# Patient Record
Sex: FEMALE | URBAN - METROPOLITAN AREA
[De-identification: names, ages, dates, MRNs, and addresses within clinical notes are randomized per-mention and may not be internally consistent; named-entity substitution may affect disease eponyms.]

---

## 2023-09-27 ENCOUNTER — TELEPHONE (OUTPATIENT)
Dept: BREAST CENTER | Age: 53
End: 2023-09-27

## 2023-09-27 NOTE — TELEPHONE ENCOUNTER
RN made first attempt to schedule patient for consult with [facility] breast clinic. Pt was referred to office for nipple discharge and breast pain by [facility]. RN left voicemail with office contact information for patient to call back and schedule referral appt.     Verify if B/L D/C and pain or unilateral    Patient imaging from [facility] already in PACS      Patient needs scheduled for NEW PATIENT- nipple discharge, left on/off x 2years white to tan in color, somtimes happens with compression, breast pain-imaging [place](in PACS) recommended breast MRI-ref by [facility]      Electronically signed by Alaina Peraza RN on 9/27/23 at 1:31 PM EDT

## 2023-10-26 ENCOUNTER — TELEPHONE (OUTPATIENT)
Dept: BREAST CENTER | Age: 53
End: 2023-10-26

## 2023-10-26 NOTE — TELEPHONE ENCOUNTER
Second message left and attempt to schedule patient a consultation in the breast clinic. Voicemail left with callback number. Patient can be scheduled for a clinical follow-up with one of our providers, then determine if breast MRI is needed.

## 2024-01-08 ENCOUNTER — OFFICE VISIT (OUTPATIENT)
Dept: BREAST CENTER | Age: 54
End: 2024-01-08
Payer: COMMERCIAL

## 2024-01-08 VITALS
BODY MASS INDEX: 34.82 KG/M2 | TEMPERATURE: 98.2 F | RESPIRATION RATE: 16 BRPM | HEIGHT: 65 IN | SYSTOLIC BLOOD PRESSURE: 134 MMHG | WEIGHT: 209 LBS | OXYGEN SATURATION: 93 % | DIASTOLIC BLOOD PRESSURE: 86 MMHG | HEART RATE: 69 BPM

## 2024-01-08 DIAGNOSIS — N64.52 NIPPLE DISCHARGE: Primary | ICD-10-CM

## 2024-01-08 PROCEDURE — 99203 OFFICE O/P NEW LOW 30 MIN: CPT | Performed by: SURGERY

## 2024-01-08 RX ORDER — LORATADINE, PSEUDOEPHEDRINE SULFATE 5; 120 MG/1; MG/1
1 TABLET, FILM COATED, EXTENDED RELEASE ORAL EVERY 12 HOURS
COMMUNITY
Start: 2023-12-13

## 2024-01-08 RX ORDER — CITALOPRAM 40 MG/1
40 TABLET ORAL DAILY
COMMUNITY
Start: 2023-12-12

## 2024-01-08 RX ORDER — VITAMIN E 268 MG
400 CAPSULE ORAL DAILY
COMMUNITY
Start: 2023-11-22

## 2024-01-08 RX ORDER — GABAPENTIN 400 MG/1
CAPSULE ORAL
COMMUNITY
Start: 2024-01-07

## 2024-01-08 RX ORDER — MONTELUKAST SODIUM 10 MG/1
TABLET ORAL
COMMUNITY
Start: 2023-12-26

## 2024-01-08 NOTE — PROGRESS NOTES
Date of Visit: 2024  New Patient    25      DIAGNOSIS:  1. (24) BILATERAL nipple discharge  * at least one episode of blood    IMAGING/PROCEDURES:  1. (23) BILATERAL d-mammogram and US: BIRADS-0  * Breast tissue fatty replaced  * No findings  * Recommend MRI  2. (23) Film consult  * Agreement      HISTORY OF PRESENT ILLNESS  Katrin Crowell was in the office today for her consultation regarding a history of nipple discharge with episodes of bloody discharge.    Katrin informs me that she developed spontaneous bilateral discharge approximately 2 years ago.  She notes that initially it was very light in color and fairly viscous.  Over time the discharge has become less viscous and at times watery.  She has noted an episode of bloody discharge at least once from each nipple.    The patient underwent diagnostic imaging studies.  There were no reported findings however the study was given a BI-RADS 0 and a breast MRI was recommended.    The patient is in the office now to discuss the above and receive any additional recommendations.    BREAST SYMPTOMS  Katrin again notes ongoing discharge from both nipples.  She again has seen at least 1 episode of bloody discharge from either side.  The patient also reports what she describes as \"firmness\" of the nipples.  She also describes episodes of intermittent nipple retraction.  The patient reports no palpable masses.  She notes no skin retraction or discoloration.    PAST BREAST HISTORY  Katrin has had no prior breast biopsies and has had no breast surgeries.    PAST MEDICAL/SURGICAL HISTORY  No past medical history on file.  Past Surgical History:   Procedure Laterality Date     SECTION      CHOLECYSTECTOMY      TUBAL LIGATION         MEDICATIONS    Current Outpatient Medications:     vitamin D3 (CHOLECALCIFEROL) 125 MCG (5000 UT) TABS tablet, Take 1 tablet by mouth daily, Disp: , Rfl:     citalopram (CELEXA) 40 MG tablet, Take 1 tablet by mouth daily,

## 2024-01-10 ENCOUNTER — TELEPHONE (OUTPATIENT)
Dept: BREAST CENTER | Age: 54
End: 2024-01-10

## 2024-01-10 NOTE — TELEPHONE ENCOUNTER
No prior authorization required for breast MRI 17132 - per [facility] -- Reference # [id number] spoke with Esther GARCIA

## 2024-01-23 ENCOUNTER — HOSPITAL ENCOUNTER (OUTPATIENT)
Dept: MRI IMAGING | Age: 54
Discharge: HOME | End: 2024-01-25
Attending: SURGERY
Payer: COMMERCIAL

## 2024-01-23 DIAGNOSIS — N64.52 NIPPLE DISCHARGE: ICD-10-CM

## 2024-01-23 PROCEDURE — A9585 GADOBUTROL INJECTION: HCPCS | Performed by: RADIOLOGY

## 2024-01-23 PROCEDURE — 6360000004 HC RX CONTRAST MEDICATION: Performed by: RADIOLOGY

## 2024-01-23 PROCEDURE — C8908 MRI W/O FOL W/CONT, BREAST,: HCPCS

## 2024-01-23 RX ORDER — GADOBUTROL 604.72 MG/ML
9 INJECTION INTRAVENOUS
Status: COMPLETED | OUTPATIENT
Start: 2024-01-23 | End: 2024-01-23

## 2024-01-23 RX ADMIN — GADOBUTROL 9 ML: 604.72 INJECTION INTRAVENOUS at 12:56

## 2024-07-22 ENCOUNTER — TELEPHONE (OUTPATIENT)
Dept: BREAST CENTER | Age: 54
End: 2024-07-22

## 2024-07-22 NOTE — TELEPHONE ENCOUNTER
Called and left detailed message with call back number to reschedule her missed appointment today.    Electronically signed by Jaylene Campbell RN on 7/22/24 at 3:33 PM EDT

## 2024-07-22 NOTE — TELEPHONE ENCOUNTER
Patient was scheduled for an office visit with Dr Fernandes 7/22/24 -- patient did not show for appointment.  MA attempted to contact patient to reschedule - left a message on patient voice mail - waiting for a return call.

## 2024-08-20 ENCOUNTER — OFFICE VISIT (OUTPATIENT)
Dept: NEUROSURGERY | Age: 54
End: 2024-08-20
Payer: COMMERCIAL

## 2024-08-20 VITALS
HEIGHT: 65 IN | BODY MASS INDEX: 33.89 KG/M2 | DIASTOLIC BLOOD PRESSURE: 91 MMHG | HEART RATE: 82 BPM | SYSTOLIC BLOOD PRESSURE: 146 MMHG | OXYGEN SATURATION: 92 % | WEIGHT: 203.4 LBS

## 2024-08-20 DIAGNOSIS — S22.070A CLOSED WEDGE COMPRESSION FRACTURE OF T9 VERTEBRA, INITIAL ENCOUNTER (HCC): ICD-10-CM

## 2024-08-20 DIAGNOSIS — S22.060A CLOSED WEDGE COMPRESSION FRACTURE OF T8 VERTEBRA, INITIAL ENCOUNTER (HCC): Primary | ICD-10-CM

## 2024-08-20 PROCEDURE — 99203 OFFICE O/P NEW LOW 30 MIN: CPT | Performed by: STUDENT IN AN ORGANIZED HEALTH CARE EDUCATION/TRAINING PROGRAM

## 2024-08-20 RX ORDER — CEPHALEXIN 500 MG/1
500 CAPSULE ORAL 4 TIMES DAILY
COMMUNITY
Start: 2024-06-10

## 2024-08-20 RX ORDER — CYCLOBENZAPRINE HCL 10 MG
10 TABLET ORAL 2 TIMES DAILY PRN
COMMUNITY
Start: 2024-08-14

## 2024-08-20 ASSESSMENT — ENCOUNTER SYMPTOMS
ABDOMINAL PAIN: 0
PHOTOPHOBIA: 0
BACK PAIN: 1
TROUBLE SWALLOWING: 0
SHORTNESS OF BREATH: 0

## 2024-08-20 NOTE — PROGRESS NOTES
Mental Status: She is alert.      Comments: A&Ox3  CN3-12 intact  Motor Strength full   Sensation intact to light touch   Reflexes normal    Psychiatric:         Thought Content: Thought content normal.       [id number] MRI Thoracic Spine from [place]       Assessment:  Katrin Crowell is a 53 y.o. y/o female who presents for evaluation of mid back pain. She denies any trauma. She has tried PT with no relief. MRI as above.    Plan:  -Pain control and expectations discussed  -MRI reviewed and discussed in detail, patient with acute fracture on MRI done in . Will obtain TLSO and have patient follow up in 1 month with repeat XR  -OARRS reviewed  -Call/return sooner if symptoms worsen or new issues arise in the interim     -The patient is diagnosed with Primary and secondary diagnoses T8 and T9 compression fracture. Patient is in need of a custom fit TLSO(soft) spinal orthosis to accommodate abnormal curvature of the spine and or body habitus, provide external stabilization, reduce pain by restricting motion in the sagittal, coronal and transverse planes of motion and facilitate healing. This will significantly reduce further risk of trauma to the spine and enable patient to be upright and participate in the rehabilitation process.  Ultimately the patient will be able to safely and effectively achieve an increased level of physical activity, leading to an overall improved quality of life.       Electronically signed by Annabella Harrison PA-C on 2024 at 1:51 PM